# Patient Record
Sex: MALE | Race: WHITE | NOT HISPANIC OR LATINO | Employment: OTHER | URBAN - METROPOLITAN AREA
[De-identification: names, ages, dates, MRNs, and addresses within clinical notes are randomized per-mention and may not be internally consistent; named-entity substitution may affect disease eponyms.]

---

## 2017-09-13 ENCOUNTER — RECORDS - HEALTHEAST (OUTPATIENT)
Dept: LAB | Facility: CLINIC | Age: 72
End: 2017-09-13

## 2017-09-13 ENCOUNTER — RECORDS - HEALTHEAST (OUTPATIENT)
Dept: ADMINISTRATIVE | Facility: OTHER | Age: 72
End: 2017-09-13

## 2017-09-14 LAB
CHOLEST SERPL-MCNC: 151 MG/DL
FASTING STATUS PATIENT QL REPORTED: NO
HDLC SERPL-MCNC: 48 MG/DL
LDLC SERPL CALC-MCNC: 77 MG/DL
TRIGL SERPL-MCNC: 128 MG/DL

## 2017-10-13 ENCOUNTER — COMMUNICATION - HEALTHEAST (OUTPATIENT)
Dept: CARDIOLOGY | Facility: CLINIC | Age: 72
End: 2017-10-13

## 2017-10-13 DIAGNOSIS — E78.2 MIXED HYPERLIPIDEMIA: ICD-10-CM

## 2017-10-13 DIAGNOSIS — I10 HTN (HYPERTENSION): ICD-10-CM

## 2017-10-16 RX ORDER — LOSARTAN POTASSIUM 25 MG/1
TABLET ORAL
Qty: 30 TABLET | Refills: 0 | Status: SHIPPED | OUTPATIENT
Start: 2017-10-16

## 2017-10-16 RX ORDER — ATORVASTATIN CALCIUM 80 MG/1
TABLET, FILM COATED ORAL
Qty: 30 TABLET | Refills: 0 | Status: SHIPPED | OUTPATIENT
Start: 2017-10-16

## 2018-05-24 ENCOUNTER — COMMUNICATION - HEALTHEAST (OUTPATIENT)
Dept: ONCOLOGY | Facility: HOSPITAL | Age: 73
End: 2018-05-24

## 2018-06-26 ENCOUNTER — HOSPITAL ENCOUNTER (OUTPATIENT)
Dept: CT IMAGING | Facility: HOSPITAL | Age: 73
Setting detail: RADIATION/ONCOLOGY SERIES
Discharge: STILL ADMITTED OR ADMINISTRATIVE | End: 2018-06-26
Attending: INTERNAL MEDICINE

## 2018-06-26 DIAGNOSIS — R91.8 MULTIPLE LUNG NODULES ON CT: ICD-10-CM

## 2018-06-28 ENCOUNTER — RECORDS - HEALTHEAST (OUTPATIENT)
Dept: ADMINISTRATIVE | Facility: OTHER | Age: 73
End: 2018-06-28

## 2018-06-28 ENCOUNTER — OFFICE VISIT - HEALTHEAST (OUTPATIENT)
Dept: ONCOLOGY | Facility: HOSPITAL | Age: 73
End: 2018-06-28

## 2018-06-28 DIAGNOSIS — R91.8 MULTIPLE LUNG NODULES ON CT: ICD-10-CM

## 2018-07-19 ENCOUNTER — RECORDS - HEALTHEAST (OUTPATIENT)
Dept: LAB | Facility: CLINIC | Age: 73
End: 2018-07-19

## 2018-07-19 LAB
ALBUMIN SERPL-MCNC: 3.6 G/DL (ref 3.5–5)
ALP SERPL-CCNC: 107 U/L (ref 45–120)
ALT SERPL W P-5'-P-CCNC: 44 U/L (ref 0–45)
ANION GAP SERPL CALCULATED.3IONS-SCNC: 9 MMOL/L (ref 5–18)
AST SERPL W P-5'-P-CCNC: 31 U/L (ref 0–40)
BILIRUB SERPL-MCNC: 1.2 MG/DL (ref 0–1)
BUN SERPL-MCNC: 15 MG/DL (ref 8–28)
CALCIUM SERPL-MCNC: 9.1 MG/DL (ref 8.5–10.5)
CHLORIDE BLD-SCNC: 105 MMOL/L (ref 98–107)
CHOLEST SERPL-MCNC: 144 MG/DL
CO2 SERPL-SCNC: 28 MMOL/L (ref 22–31)
CREAT SERPL-MCNC: 0.9 MG/DL (ref 0.7–1.3)
ERYTHROCYTE [DISTWIDTH] IN BLOOD BY AUTOMATED COUNT: 12.8 % (ref 11–14.5)
FASTING STATUS PATIENT QL REPORTED: NORMAL
GFR SERPL CREATININE-BSD FRML MDRD: >60 ML/MIN/1.73M2
GLUCOSE BLD-MCNC: 111 MG/DL (ref 70–125)
HCT VFR BLD AUTO: 42.7 % (ref 40–54)
HDLC SERPL-MCNC: 44 MG/DL
HGB BLD-MCNC: 14.4 G/DL (ref 14–18)
LDLC SERPL CALC-MCNC: 80 MG/DL
MCH RBC QN AUTO: 31.6 PG (ref 27–34)
MCHC RBC AUTO-ENTMCNC: 33.7 G/DL (ref 32–36)
MCV RBC AUTO: 94 FL (ref 80–100)
PLATELET # BLD AUTO: 278 THOU/UL (ref 140–440)
PMV BLD AUTO: 10.6 FL (ref 8.5–12.5)
POTASSIUM BLD-SCNC: 4.7 MMOL/L (ref 3.5–5)
PROT SERPL-MCNC: 6.8 G/DL (ref 6–8)
PSA SERPL-MCNC: 0.8 NG/ML (ref 0–6.5)
RBC # BLD AUTO: 4.56 MILL/UL (ref 4.4–6.2)
SODIUM SERPL-SCNC: 142 MMOL/L (ref 136–145)
TRIGL SERPL-MCNC: 98 MG/DL
WBC: 5.8 THOU/UL (ref 4–11)

## 2018-07-24 ENCOUNTER — AMBULATORY - HEALTHEAST (OUTPATIENT)
Dept: CARDIOLOGY | Facility: CLINIC | Age: 73
End: 2018-07-24

## 2019-07-09 ENCOUNTER — RECORDS - HEALTHEAST (OUTPATIENT)
Dept: LAB | Facility: CLINIC | Age: 74
End: 2019-07-09

## 2019-07-09 LAB
ALBUMIN SERPL-MCNC: 3.8 G/DL (ref 3.5–5)
ALP SERPL-CCNC: 110 U/L (ref 45–120)
ALT SERPL W P-5'-P-CCNC: 43 U/L (ref 0–45)
ANION GAP SERPL CALCULATED.3IONS-SCNC: 9 MMOL/L (ref 5–18)
AST SERPL W P-5'-P-CCNC: 29 U/L (ref 0–40)
BILIRUB SERPL-MCNC: 1.3 MG/DL (ref 0–1)
BUN SERPL-MCNC: 17 MG/DL (ref 8–28)
CALCIUM SERPL-MCNC: 9.9 MG/DL (ref 8.5–10.5)
CHLORIDE BLD-SCNC: 101 MMOL/L (ref 98–107)
CHOLEST SERPL-MCNC: 151 MG/DL
CO2 SERPL-SCNC: 31 MMOL/L (ref 22–31)
CREAT SERPL-MCNC: 1 MG/DL (ref 0.7–1.3)
ERYTHROCYTE [DISTWIDTH] IN BLOOD BY AUTOMATED COUNT: 12.4 % (ref 11–14.5)
FASTING STATUS PATIENT QL REPORTED: NORMAL
GFR SERPL CREATININE-BSD FRML MDRD: >60 ML/MIN/1.73M2
GLUCOSE BLD-MCNC: 119 MG/DL (ref 70–125)
HCT VFR BLD AUTO: 47.1 % (ref 40–54)
HDLC SERPL-MCNC: 45 MG/DL
HGB BLD-MCNC: 15.3 G/DL (ref 14–18)
LDLC SERPL CALC-MCNC: 78 MG/DL
MCH RBC QN AUTO: 31 PG (ref 27–34)
MCHC RBC AUTO-ENTMCNC: 32.5 G/DL (ref 32–36)
MCV RBC AUTO: 95 FL (ref 80–100)
PLATELET # BLD AUTO: 284 THOU/UL (ref 140–440)
PMV BLD AUTO: 10.7 FL (ref 8.5–12.5)
POTASSIUM BLD-SCNC: 5 MMOL/L (ref 3.5–5)
PROT SERPL-MCNC: 7.4 G/DL (ref 6–8)
RBC # BLD AUTO: 4.94 MILL/UL (ref 4.4–6.2)
SODIUM SERPL-SCNC: 141 MMOL/L (ref 136–145)
TRIGL SERPL-MCNC: 138 MG/DL
WBC: 7.1 THOU/UL (ref 4–11)

## 2019-10-18 ENCOUNTER — AMBULATORY - HEALTHEAST (OUTPATIENT)
Dept: OTHER | Facility: CLINIC | Age: 74
End: 2019-10-18

## 2019-10-18 ENCOUNTER — NURSE TRIAGE (OUTPATIENT)
Dept: NURSING | Facility: CLINIC | Age: 74
End: 2019-10-18

## 2019-10-18 NOTE — TELEPHONE ENCOUNTER
He is going to go to [facility] for evaluation. He said he, in the past, didn't have a typical heart attack. He thinks this could be stress related. He describes it as feeling funny in his chest.  Sigrid Peck RN-[facility]      Additional Information    Negative: Passed out (i.e., fainted, collapsed and was not responding)    Negative: Shock suspected (e.g., cold/pale/clammy skin, too weak to stand, low BP, rapid pulse)    Negative: Difficult to awaken or acting confused (e.g., disoriented, slurred speech)    Negative: Visible sweat on face or sweat dripping down face    Negative: Unable to walk, or can only walk with assistance (e.g., requires support)    Negative: Received SHOCK from implantable cardiac defibrillator and has persisting symptoms (i.e., palpitations, lightheadedness)    Negative: Sounds like a life-threatening emergency to the triager    Negative: Chest pain    Negative: Difficulty breathing    Negative: Dizziness, lightheadedness, or weakness    Negative: Heart beating very rapidly (e.g., > 140 / minute) and present now (EXCEPTION: during exercise)    Negative: Heart beating very slowly (e.g., < 50 / minute) (EXCEPTION: athlete)    Negative: New or worsened shortness of breath with activity (dyspnea on exertion)    Patient sounds very sick or weak to the triager    Protocols used: HEART RATE AND HEARTBEAT [id number]

## 2020-09-09 ENCOUNTER — RECORDS - HEALTHEAST (OUTPATIENT)
Dept: LAB | Facility: CLINIC | Age: 75
End: 2020-09-09

## 2020-09-09 LAB
ALBUMIN SERPL-MCNC: 3.8 G/DL (ref 3.5–5)
ALP SERPL-CCNC: 111 U/L (ref 45–120)
ALT SERPL W P-5'-P-CCNC: 51 U/L (ref 0–45)
ANION GAP SERPL CALCULATED.3IONS-SCNC: 10 MMOL/L (ref 5–18)
AST SERPL W P-5'-P-CCNC: 32 U/L (ref 0–40)
BILIRUB SERPL-MCNC: 1.3 MG/DL (ref 0–1)
BUN SERPL-MCNC: 11 MG/DL (ref 8–28)
CALCIUM SERPL-MCNC: 9.6 MG/DL (ref 8.5–10.5)
CHLORIDE BLD-SCNC: 101 MMOL/L (ref 98–107)
CHOLEST SERPL-MCNC: 156 MG/DL
CO2 SERPL-SCNC: 30 MMOL/L (ref 22–31)
CREAT SERPL-MCNC: 1.02 MG/DL (ref 0.7–1.3)
FASTING STATUS PATIENT QL REPORTED: NORMAL
GFR SERPL CREATININE-BSD FRML MDRD: >60 ML/MIN/1.73M2
GLUCOSE BLD-MCNC: 114 MG/DL (ref 70–125)
HDLC SERPL-MCNC: 49 MG/DL
LDLC SERPL CALC-MCNC: 78 MG/DL
POTASSIUM BLD-SCNC: 5.3 MMOL/L (ref 3.5–5)
PROT SERPL-MCNC: 7.3 G/DL (ref 6–8)
PSA SERPL-MCNC: 1 NG/ML (ref 0–6.5)
SODIUM SERPL-SCNC: 141 MMOL/L (ref 136–145)
TRIGL SERPL-MCNC: 143 MG/DL

## 2021-06-01 VITALS — BODY MASS INDEX: 40.25 KG/M2 | WEIGHT: 249.38 LBS

## 2021-06-05 ENCOUNTER — RECORDS - HEALTHEAST (OUTPATIENT)
Dept: CARDIOLOGY | Facility: CLINIC | Age: 76
End: 2021-06-05

## 2021-06-05 DIAGNOSIS — I25.10 CORONARY ATHEROSCLEROSIS: ICD-10-CM

## 2021-06-05 DIAGNOSIS — E78.5 OTHER AND UNSPECIFIED HYPERLIPIDEMIA: ICD-10-CM

## 2021-06-16 PROBLEM — R07.9 CHEST PAIN: Status: ACTIVE | Noted: 2019-10-18

## 2021-06-19 NOTE — PROGRESS NOTES
[facility] Progress Note    Patient: Vignesh Seymour  MRN: [id number]  Date of Service: 6/28/2018        Reason for visit      1. Multiple lung nodules on CT        Assessment     Pulmonary nodules which seems benign. This is not changed.  Weight gain.  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  Good diet and exercise.  Advised to loose weight.    Clinical stage      No matching staging information was found for the patient.    History     Vignesh Seymour is a very pleasant 72 y.o. old male with a history of lung nodules located in both lung measuring 4mm in size, presenting incidentally after a post op hypoxia episode in 2011. Due to his over 20 pack year of smoking he has been on surveillance.    Comes in today for scheduled follow up.      Past Medical History     Past Medical History:   Diagnosis Date     Coronary artery disease     non STEMI 2012     Esophageal reflux      History of nuclear stress test 5/14/2013    8:06 minutes Favio protocol, 91% predicted max, sm to med sized inferior wall ischemic defect, EF 67%     Hyperlipidemia      Hypertension      Myocardial infarction, inferior wall (H) 8/2012           Review of Systems   Constitutional  Constitutional (WDL): All constitutional elements are within defined limits  Neurosensory  Neurosensory (WDL): All neurosensory elements are within defined limits  Cardiovascular  Cardiovascular (WDL): All cardiovascular elements are within defined limits  Pulmonary  Respiratory (WDL): Within Defined Limits  Gastrointestinal  Gastrointestinal (WDL): All gastrointestinal elements are within defined limits  Genitourinary  Genitourinary (WDL): All genitourinary elements are within defined limits  Integumentary  Integumentary (WDL): All integumentary elements are within defined limits  Patient Coping  Patient Coping: Accepting  Accompanied by  Accompanied by: Alone    ECOG performance status and Distress Assessment      ECOG Performance:     ECOG Performance Status: 0    Distress Assessment  Distress Assessment Score: No distress:     Pain Status  Currently in Pain: No/denies        Vital Signs     Vitals:    06/28/18 1415   BP: (!) 171/95   Pulse: 61   Temp: 98.6  F (37  C)   SpO2: 96%       Physical Exam     GENERAL: No acute distress. Cooperative in conversation.   HEENT: Pupils are equal, round and reactive. Oral mucosa is clean and intact. No ulcerations or mucositis noted. No bleeding noted.  RESP:Chest symmetric lungs are clear bilaterally per auscultation. Regular respiratory rate. No wheezes or rhonchi.  CV: Normal S1 S2 Regular, rate and rhythm. No murmurs.  ABD: Nondistended, soft, nontender. Positive bowel sounds. No organomegaly.   EXTREMITIES: No lower extremity edema.   NEURO: Non- focal. Alert and oriented x3.  Cranial nerves appear intact.  PSYCH: Within normal limits. No depression or anxiety.  SKIN: Warm dry intact.    LYMPH NODES: Bilateral cervical, supraclavicular, axillary lymph node examination was done.  Negative for any palpable adenopathy.      Lab Results     Results for orders placed or performed in visit on 09/13/17   Lipid Cascade   Result Value Ref Range    Cholesterol 151 <=199 mg/dL    Triglycerides 128 <=149 mg/dL    HDL Cholesterol 48 >=40 mg/dL    LDL Calculated 77 <=129 mg/dL    Patient Fasting > 8hrs? No    Comprehensive Metabolic Panel   Result Value Ref Range    Sodium 142 136 - 145 mmol/L    Potassium 4.4 3.5 - 5.0 mmol/L    Chloride 105 98 - 107 mmol/L    CO2 27 22 - 31 mmol/L    Anion Gap, Calculation 10 5 - 18 mmol/L    Glucose 105 70 - 125 mg/dL    BUN 15 8 - 28 mg/dL    Creatinine 1.11 0.70 - 1.30 mg/dL    GFR MDRD Af Amer >60 >60 mL/min/1.73m2    GFR MDRD Non Af Amer >60 >60 mL/min/1.73m2    Bilirubin, Total 1.4 (H) 0.0 - 1.0 mg/dL    Calcium 9.4 8.5 - 10.5 mg/dL    Protein, Total 7.6 6.0 - 8.0 g/dL    Albumin 4.0 3.5 - 5.0 g/dL    Alkaline Phosphatase 104 45 - 120 U/L    AST 33 0 - 40 U/L    ALT 43 0 -  45 U/L   HM2(CBC w/o Differential)   Result Value Ref Range    WBC 9.8 4.0 - 11.0 thou/uL    RBC 5.01 4.40 - 6.20 mill/uL    Hemoglobin 15.7 14.0 - 18.0 g/dL    Hematocrit 46.8 40.0 - 54.0 %    MCV 93 80 - 100 fL    MCH 31.3 27.0 - 34.0 pg    MCHC 33.5 32.0 - 36.0 g/dL    RDW 11.9 11.0 - 14.5 %    Platelets 299 140 - 440 thou/uL    MPV 11.2 8.5 - 12.5 fL         Imaging Results     Ct Chest Without Contrast    Result Date: 6/26/2018  CT CHEST WO CONTRAST 6/26/2018 7:08 AM INDICATION: Lung nodules TECHNIQUE: Routine chest. Dose reduction techniques were used. IV CONTRAST: None COMPARISON: CT chest exam 12/16/2013 and 6/21/2016 FINDINGS: LUNGS AND PLEURA: 3 mm nodule left upper lobe in image 22, 2 mm nodule left upper lobe on image 55, 4 mm nodule in the lingula on image 65, 3 mm nodule left lower lobe on image 67, and 3 mm nodule right lower lobe in image 59 are all stable dating back to 2013. No new nodules. No infiltrates or effusions. Small bilateral fat-containing Bochdalek hernias are unchanged. MEDIASTINUM: No enlarged mediastinal or hilar lymph nodes. Coronary artery calcifications. Small esophageal hiatal hernia. LIMITED UPPER ABDOMEN: Negative. MUSCULOSKELETAL: Hypertrophic changes thoracic spine.     CONCLUSION: 1.  Multiple bilateral pulmonary nodules have all been stable since 2013 and meet the criteria for benign nodules. No further follow-up required.        Randy Carrasco MD

## 2021-10-12 ENCOUNTER — LAB REQUISITION (OUTPATIENT)
Dept: LAB | Facility: CLINIC | Age: 76
End: 2021-10-12
Payer: MEDICARE

## 2021-10-12 DIAGNOSIS — E78.5 HYPERLIPIDEMIA, UNSPECIFIED: ICD-10-CM

## 2021-10-12 DIAGNOSIS — E66.9 OBESITY, UNSPECIFIED: ICD-10-CM

## 2021-10-12 DIAGNOSIS — Z00.00 ENCOUNTER FOR GENERAL ADULT MEDICAL EXAMINATION WITHOUT ABNORMAL FINDINGS: ICD-10-CM

## 2021-10-12 DIAGNOSIS — I25.10 ATHEROSCLEROTIC HEART DISEASE OF NATIVE CORONARY ARTERY WITHOUT ANGINA PECTORIS: ICD-10-CM

## 2021-10-12 LAB
ALBUMIN SERPL-MCNC: 3.5 G/DL (ref 3.5–5)
ALP SERPL-CCNC: 105 U/L (ref 45–120)
ALT SERPL W P-5'-P-CCNC: 45 U/L (ref 0–45)
ANION GAP SERPL CALCULATED.3IONS-SCNC: 13 MMOL/L (ref 5–18)
AST SERPL W P-5'-P-CCNC: 30 U/L (ref 0–40)
BILIRUB SERPL-MCNC: 1.1 MG/DL (ref 0–1)
BUN SERPL-MCNC: 10 MG/DL (ref 8–28)
CALCIUM SERPL-MCNC: 9.3 MG/DL (ref 8.5–10.5)
CHLORIDE BLD-SCNC: 100 MMOL/L (ref 98–107)
CHOLEST SERPL-MCNC: 144 MG/DL
CO2 SERPL-SCNC: 26 MMOL/L (ref 22–31)
CREAT SERPL-MCNC: 1 MG/DL (ref 0.7–1.3)
FASTING STATUS PATIENT QL REPORTED: NORMAL
GFR SERPL CREATININE-BSD FRML MDRD: 74 ML/MIN/1.73M2
GLUCOSE BLD-MCNC: 170 MG/DL (ref 70–125)
HDLC SERPL-MCNC: 47 MG/DL
LDLC SERPL CALC-MCNC: 71 MG/DL
POTASSIUM BLD-SCNC: 4.5 MMOL/L (ref 3.5–5)
PROT SERPL-MCNC: 6.5 G/DL (ref 6–8)
PSA SERPL-MCNC: 1.02 UG/L (ref 0–6.5)
SODIUM SERPL-SCNC: 139 MMOL/L (ref 136–145)
TRIGL SERPL-MCNC: 132 MG/DL

## 2021-10-12 PROCEDURE — G0103 PSA SCREENING: HCPCS | Mod: ORL | Performed by: STUDENT IN AN ORGANIZED HEALTH CARE EDUCATION/TRAINING PROGRAM

## 2021-10-12 PROCEDURE — 80061 LIPID PANEL: CPT | Mod: ORL | Performed by: STUDENT IN AN ORGANIZED HEALTH CARE EDUCATION/TRAINING PROGRAM

## 2021-10-12 PROCEDURE — 80053 COMPREHEN METABOLIC PANEL: CPT | Mod: ORL | Performed by: STUDENT IN AN ORGANIZED HEALTH CARE EDUCATION/TRAINING PROGRAM

## 2021-10-21 ENCOUNTER — APPOINTMENT (OUTPATIENT)
Dept: RADIOLOGY | Facility: HOSPITAL | Age: 76
End: 2021-10-21
Attending: EMERGENCY MEDICINE
Payer: MEDICARE

## 2021-10-21 LAB
ANION GAP SERPL CALCULATED.3IONS-SCNC: 9 MMOL/L (ref 5–18)
BASOPHILS # BLD AUTO: 0 10E3/UL (ref 0–0.2)
BASOPHILS NFR BLD AUTO: 0 %
BUN SERPL-MCNC: 12 MG/DL (ref 8–28)
CALCIUM SERPL-MCNC: 9.7 MG/DL (ref 8.5–10.5)
CHLORIDE BLD-SCNC: 101 MMOL/L (ref 98–107)
CO2 SERPL-SCNC: 29 MMOL/L (ref 22–31)
CREAT SERPL-MCNC: 1.05 MG/DL (ref 0.7–1.3)
EOSINOPHIL # BLD AUTO: 0.2 10E3/UL (ref 0–0.7)
EOSINOPHIL NFR BLD AUTO: 2 %
ERYTHROCYTE [DISTWIDTH] IN BLOOD BY AUTOMATED COUNT: 13.2 % (ref 10–15)
GFR SERPL CREATININE-BSD FRML MDRD: 70 ML/MIN/1.73M2
GLUCOSE BLD-MCNC: 107 MG/DL (ref 70–125)
HCT VFR BLD AUTO: 45.1 % (ref 40–53)
HGB BLD-MCNC: 14.8 G/DL (ref 13.3–17.7)
HOLD SPECIMEN: NORMAL
IMM GRANULOCYTES # BLD: 0 10E3/UL
IMM GRANULOCYTES NFR BLD: 0 %
LYMPHOCYTES # BLD AUTO: 2.8 10E3/UL (ref 0.8–5.3)
LYMPHOCYTES NFR BLD AUTO: 25 %
MCH RBC QN AUTO: 31.4 PG (ref 26.5–33)
MCHC RBC AUTO-ENTMCNC: 32.8 G/DL (ref 31.5–36.5)
MCV RBC AUTO: 96 FL (ref 78–100)
MONOCYTES # BLD AUTO: 1.3 10E3/UL (ref 0–1.3)
MONOCYTES NFR BLD AUTO: 12 %
NEUTROPHILS # BLD AUTO: 7 10E3/UL (ref 1.6–8.3)
NEUTROPHILS NFR BLD AUTO: 61 %
NRBC # BLD AUTO: 0 10E3/UL
NRBC BLD AUTO-RTO: 0 /100
PLATELET # BLD AUTO: 323 10E3/UL (ref 150–450)
POTASSIUM BLD-SCNC: 4.4 MMOL/L (ref 3.5–5)
RBC # BLD AUTO: 4.72 10E6/UL (ref 4.4–5.9)
SODIUM SERPL-SCNC: 139 MMOL/L (ref 136–145)
TROPONIN I SERPL-MCNC: <0.01 NG/ML (ref 0–0.29)
WBC # BLD AUTO: 11.3 10E3/UL (ref 4–11)

## 2021-10-21 PROCEDURE — 82247 BILIRUBIN TOTAL: CPT | Performed by: EMERGENCY MEDICINE

## 2021-10-21 PROCEDURE — 99285 EMERGENCY DEPT VISIT HI MDM: CPT | Mod: 25

## 2021-10-21 PROCEDURE — 93005 ELECTROCARDIOGRAM TRACING: CPT | Performed by: EMERGENCY MEDICINE

## 2021-10-21 PROCEDURE — 84484 ASSAY OF TROPONIN QUANT: CPT | Performed by: STUDENT IN AN ORGANIZED HEALTH CARE EDUCATION/TRAINING PROGRAM

## 2021-10-21 PROCEDURE — 85025 COMPLETE CBC W/AUTO DIFF WBC: CPT | Performed by: STUDENT IN AN ORGANIZED HEALTH CARE EDUCATION/TRAINING PROGRAM

## 2021-10-21 PROCEDURE — 36415 COLL VENOUS BLD VENIPUNCTURE: CPT | Performed by: STUDENT IN AN ORGANIZED HEALTH CARE EDUCATION/TRAINING PROGRAM

## 2021-10-21 PROCEDURE — 83690 ASSAY OF LIPASE: CPT | Performed by: EMERGENCY MEDICINE

## 2021-10-21 PROCEDURE — 84484 ASSAY OF TROPONIN QUANT: CPT | Performed by: EMERGENCY MEDICINE

## 2021-10-21 PROCEDURE — 80048 BASIC METABOLIC PNL TOTAL CA: CPT | Performed by: EMERGENCY MEDICINE

## 2021-10-21 PROCEDURE — 82040 ASSAY OF SERUM ALBUMIN: CPT | Performed by: EMERGENCY MEDICINE

## 2021-10-21 PROCEDURE — 85025 COMPLETE CBC W/AUTO DIFF WBC: CPT | Performed by: EMERGENCY MEDICINE

## 2021-10-21 PROCEDURE — 71046 X-RAY EXAM CHEST 2 VIEWS: CPT

## 2021-10-21 PROCEDURE — 85379 FIBRIN DEGRADATION QUANT: CPT | Performed by: EMERGENCY MEDICINE

## 2021-10-21 PROCEDURE — 80048 BASIC METABOLIC PNL TOTAL CA: CPT | Performed by: STUDENT IN AN ORGANIZED HEALTH CARE EDUCATION/TRAINING PROGRAM

## 2021-10-21 PROCEDURE — 93005 ELECTROCARDIOGRAM TRACING: CPT | Performed by: STUDENT IN AN ORGANIZED HEALTH CARE EDUCATION/TRAINING PROGRAM

## 2021-10-21 ASSESSMENT — MIFFLIN-ST. JEOR: SCORE: 1748.7

## 2021-10-22 ENCOUNTER — HOSPITAL ENCOUNTER (EMERGENCY)
Facility: HOSPITAL | Age: 76
Discharge: HOME | End: 2021-10-22
Attending: EMERGENCY MEDICINE | Admitting: EMERGENCY MEDICINE
Payer: MEDICARE

## 2021-10-22 VITALS
BODY MASS INDEX: 37.77 KG/M2 | TEMPERATURE: 97.3 F | HEIGHT: 66 IN | DIASTOLIC BLOOD PRESSURE: 69 MMHG | WEIGHT: 235 LBS | SYSTOLIC BLOOD PRESSURE: 120 MMHG | HEART RATE: 58 BPM | OXYGEN SATURATION: 97 % | RESPIRATION RATE: 18 BRPM

## 2021-10-22 DIAGNOSIS — R07.89 CHEST WALL PAIN: ICD-10-CM

## 2021-10-22 PROBLEM — I10 HYPERTENSION: Status: ACTIVE | Noted: 2021-10-22

## 2021-10-22 PROBLEM — F41.9 ANXIETY: Status: ACTIVE | Noted: 2021-10-22

## 2021-10-22 LAB
ALBUMIN SERPL-MCNC: 3.8 G/DL (ref 3.5–5)
ALBUMIN UR-MCNC: 10 MG/DL
ALP SERPL-CCNC: 113 U/L (ref 45–120)
ALT SERPL W P-5'-P-CCNC: 44 U/L (ref 0–45)
APPEARANCE UR: CLEAR
AST SERPL W P-5'-P-CCNC: 35 U/L (ref 0–40)
BILIRUB DIRECT SERPL-MCNC: 0.3 MG/DL
BILIRUB SERPL-MCNC: 0.9 MG/DL (ref 0–1)
BILIRUB UR QL STRIP: NEGATIVE
COLOR UR AUTO: YELLOW
D DIMER PPP FEU-MCNC: 0.48 UG/ML FEU (ref 0–0.5)
GLUCOSE UR STRIP-MCNC: NEGATIVE MG/DL
HGB UR QL STRIP: NEGATIVE
KETONES UR STRIP-MCNC: NEGATIVE MG/DL
LEUKOCYTE ESTERASE UR QL STRIP: NEGATIVE
LIPASE SERPL-CCNC: 67 U/L (ref 0–52)
MUCOUS THREADS #/AREA URNS LPF: PRESENT /LPF
NITRATE UR QL: NEGATIVE
PH UR STRIP: 5.5 [PH] (ref 5–7)
PROT SERPL-MCNC: 7.6 G/DL (ref 6–8)
RBC URINE: 0 /HPF
SP GR UR STRIP: 1.02 (ref 1–1.03)
SQUAMOUS EPITHELIAL: <1 /HPF
UROBILINOGEN UR STRIP-MCNC: <2 MG/DL
WBC URINE: <1 /HPF

## 2021-10-22 PROCEDURE — 81001 URINALYSIS AUTO W/SCOPE: CPT | Performed by: EMERGENCY MEDICINE

## 2021-10-22 PROCEDURE — 250N000013 HC RX MED GY IP 250 OP 250 PS 637: Performed by: EMERGENCY MEDICINE

## 2021-10-22 RX ORDER — ACETAMINOPHEN 325 MG/1
650 TABLET ORAL ONCE
Status: COMPLETED | OUTPATIENT
Start: 2021-10-22 | End: 2021-10-22

## 2021-10-22 RX ADMIN — ACETAMINOPHEN 650 MG: 325 TABLET ORAL at 03:02

## 2021-10-22 NOTE — ED TRIAGE NOTES
Patient presents to ED for evaluation of chest pain and HTN. Chest pain for past 3 days. History of MI in 2012. States it just kept getting worse so decided to come in.

## 2021-10-22 NOTE — ED PROVIDER NOTES
EMERGENCY DEPARTMENT ENCOUNTER      NAME: Vignesh Seymour  AGE: 76 year old male  YOB: 1945  MRN: [id number]  EVALUATION DATE & TIME: 10/22/2021  2:39 AM    PCP: Joel Guaman    ED PROVIDER: Trav Diaz M.D.      Chief Complaint   Patient presents with     Chest Pain         FINAL IMPRESSION:  1. Chest wall pain          ED COURSE & MEDICAL DECISION MAKING:    Pertinent Labs & Imaging studies reviewed. (See chart for details)  76 year old male presents to the Emergency Department for evaluation of chest pain. Patient appears non toxic with stable vitals signs, patient is afebrile with no tachycardia or hypoxia. Overall exam is benign. Lungs are clear and abdomen is benign, I cannot reproduce any significant tenderness with deep palpation of my abdominal exam, certainly no guarding or distention. Patient has been eating and drinking well, no change in bowel or bladder habits. Patient states that his symptoms feel nothing like his symptoms during his previous MI, he has had no substernal chest discomfort at all, no diaphoresis, shortness of breath, no vomiting. Points to the right lateral chest wall as the source of his pain, no abdominal pain, no right upper quadrant tenderness, no flank pain or CVA tenderness. Nothing to suggest cholecystitis, pancreatitis, nephrolithiasis, pyelonephritis, considered but overall very low suspicion for ACS, considered but low suspicion for PE, no cough or fever to suggest pneumonia, no body aches or fever to suggest Covid, no wheezing to suggest CHF, COPD, asthma or other restrictive lung process. We will obtain screening labs including troponin, ECG, chest x-ray, D-dimer, urine studies. Patient's symptoms had markedly improved since he arrived, he was given Tylenol here, he had already taken aspirin.    Reassessment: Troponin negative, ECG nonischemic, given duration of symptoms, atypical story and well appearance, though the patient does have cardiac risk  factors at this time nothing by our work-up or exam suggest cardiac etiology.  Again the patient even states that this does not at all feel like his previous MI, his symptoms are more right lateral chest wall.  Rest of the labs showed no acute concerning findings, slightly elevated lipase but certainly nothing to suggest pancreatitis, he has no epigastric tenderness.  Urine studies showed no blood or signs of infection, D-dimer was negative, chest x-ray reported no acute concerning findings.  Following Tylenol and repeat evaluation patient states that his right lateral chest discomfort has completely resolved.  I suspect more so chest wall musculoskeletal etiology of his symptoms.  With negative work-up and benign exam and resolution of symptoms feel he is safe for discharge and close follow-up.  Will recommend conservative management at home and have the patient follow-up with his primary care provider in the next 24 hours for continued outpatient management evaluation.  Discussed these findings and recommendations with the patient felt reassured and comfortable with discharge.  Return precautions provided.    2:55 AM I met with the patient for the initial interview and physical examination. Discussed plan for treatment and workup in the ED.    4:59 AM Repeat exam benign. Discussed findings and discharged with close follow-up.  At the conclusion of the encounter I discussed the results of all of the tests and the disposition. The questions were answered and return precautions provided. The patient or family acknowledged understanding and was agreeable with the care plan.     PPE: Provider wore gloves, N95 mask, eye protection, surgical cap, and paper mask.     MEDICATIONS GIVEN IN THE EMERGENCY:  Medications   acetaminophen (TYLENOL) tablet 650 mg (650 mg Oral Given 10/22/21 0302)       NEW PRESCRIPTIONS STARTED AT TODAY'S ER VISIT  Discharge Medication List as of 10/22/2021  5:05 AM            "    =================================================================    HPI    Patient information was obtained from: The patient    Use of Intrepreter: N/A       Vignesh Seymour is a 76 year old male with a pertient medical history of CAD and hypertension who presents to the ED via walk-in for evaluation of chest pain.    The patient reports that ~2 days ago he developed right ribcage pain that initially presented as a slight discomfort but in the past ~24 hours he mowed the lawn and afterwards it has progressively worsened. When he presented to the ED he ranked his pain at a 6-7/10, but states that it has since slightly improved. He states the pain feels similar to a \"pulled muscle\" and does not feel like his previous heart attack. His previous heart attack felt substernal and like \"a bubble\" which is not at all how his current symptoms present. He takes a full Aspirin in the morning a baby Aspirin at night and states this has helped his symptoms. The only abdominal surgery that he reports is an umbilical hernia repair. Of note, the patient also endorses having slightly more cloudy urine than usual. The patient denies any recent falls. Denies vomiting, fever, shortness of breath, difficulty urinating, diarrhea, constipation, dysuria, rashes, and any other symptoms or complaints at this time.     REVIEW OF SYSTEMS   Constitutional:  Denies fever, chills  Respiratory:  Denies productive cough or increased work of breathing  Cardiovascular:  Denies chest pain, palpitations  GI:  Denies abdominal pain, nausea, vomiting, dysuria. Positive for cloudy urine.   Musculoskeletal:  Positive for right ribcage pain.  Skin:  Denies rash   Neurologic:  Denies focal weakness  All systems negative except as marked.     PAST MEDICAL HISTORY:  History reviewed. No pertinent past medical history.    PAST SURGICAL HISTORY:  Past Surgical History:   Procedure Laterality Date     CARDIAC CATHETERIZATION      2012 thrombectomy and PCI/BMS " to RCA, moderate LAD/Diag bifurcation stenosis     CORONARY ANGIOPLASTY      BMS prox RCA 8/24/2012         CURRENT MEDICATIONS:    Prior to Admission medications    Medication Sig Start Date End Date Taking? Authorizing Provider   acetaminophen (TYLENOL) 500 MG tablet [ACETAMINOPHEN (TYLENOL) 500 MG TABLET] Take 500-1,000 mg by mouth every 6 (six) hours as needed for pain. 10/18/19   Provider, Historical   aspirin 81 MG EC tablet [ASPIRIN 81 MG EC TABLET] Take 81 mg by mouth every evening.        8/14/14   Provider, Historical   atorvastatin (LIPITOR) 80 MG tablet [ATORVASTATIN (LIPITOR) 80 MG TABLET] TAKE 1 TABLET AT BEDTIME 10/16/17   Tylor Davenport MD   cholecalciferol, vitamin D3, 1,000 unit tablet [CHOLECALCIFEROL, VITAMIN D3, 1,000 UNIT TABLET] Take 1,000 Units by mouth daily. 8/29/16   Provider, Historical   ferrous sulfate 325 (65 FE) MG tablet [FERROUS SULFATE 325 (65 FE) MG TABLET] Take 2 tablets by mouth daily with breakfast. 10/18/19   Provider, Historical   losartan (COZAAR) 25 MG tablet [LOSARTAN (COZAAR) 25 MG TABLET] TAKE 1 TABLET DAILY 10/16/17   Tylor Davenport MD   multivitamin (MULTIVITAMIN) per tablet [MULTIVITAMIN (MULTIVITAMIN) PER TABLET] Take 1 tablet by mouth daily. 8/14/14   Provider, Historical   nitroglycerin (NITROSTAT) 0.4 MG SL tablet [NITROGLYCERIN (NITROSTAT) 0.4 MG SL TABLET] Place 1 tablet (0.4 mg total) under the tongue every 5 (five) minutes as needed for chest pain. 8/29/16   Tylor Davenport MD   omeprazole (PRILOSEC) 40 MG capsule [OMEPRAZOLE (PRILOSEC) 40 MG CAPSULE] Take 40 mg by mouth daily before breakfast.        8/14/14   Provider, Historical        ALLERGIES:  Allergies   Allergen Reactions     Ibuprofen Unknown     Told not to take ibuprofen with losartan.     Penicillins Rash     To IM injection only. Has tolerated amoxicillin and ampicillin orally without problems.       FAMILY HISTORY:  Family History   Problem Relation Age of Onset     Hypertension  "Mother      Pulmonary fibrosis Father      No Known Problems Sister      Coronary Artery Disease Brother      No Known Problems Sister      Hypertension Brother      Hypertension Son        SOCIAL HISTORY:   Social History     Socioeconomic History     Marital status: Single     Spouse name: Not on file     Number of children: Not on file     Years of education: Not on file     Highest education level: Not on file   Occupational History     Not on file   Tobacco Use     Smoking status: Former Smoker     Packs/day: 1.00     Years: 17.00     Pack years: 17.00     Quit date: 2004     Years since quittin.7     Smokeless tobacco: Never Used   Substance and Sexual Activity     Alcohol use: Yes     Drug use: No     Sexual activity: Never   Other Topics Concern     Not on file   Social History Narrative     Not on file     Social Determinants of Health     Financial Resource Strain:      Difficulty of Paying Living Expenses:    Food Insecurity:      Worried About Running Out of Food in the Last Year:      Ran Out of Food in the Last Year:    Transportation Needs:      Lack of Transportation (Medical):      Lack of Transportation (Non-Medical):    Physical Activity:      Days of Exercise per Week:      Minutes of Exercise per Session:    Stress:      Feeling of Stress :    Social Connections:      Frequency of Communication with Friends and Family:      Frequency of Social Gatherings with Friends and Family:      Attends Church Services:      Active Member of Clubs or Organizations:      Attends Club or Organization Meetings:      Marital Status:    Intimate Partner Violence:      Fear of Current or Ex-Partner:      Emotionally Abused:      Physically Abused:      Sexually Abused:        VITALS:  Patient Vitals for the past 24 hrs:   BP Temp Temp src Pulse Resp SpO2 Height Weight   10/22/21 0500 120/69 -- -- 58 -- 97 % -- --   10/21/21 2231 (!) 182/84 97.3  F (36.3  C) Temporal 75 18 95 % 1.676 m (5' 6\") 106.6 " kg (235 lb)        PHYSICAL EXAM    Constitutional:  Awake, alert, in no apparent distress  HENT:  Normocephalic, Atraumatic. Bilateral external ears normal. Oropharynx moist. Nose normal. Neck- Normal range of motion with no guarding, No midline cervical tenderness, Supple, No stridor.   Eyes:  PERRL, EOMI with no signs of entrapment, Conjunctiva normal, No discharge.   Respiratory:  Normal breath sounds, No respiratory distress, No wheezing.    Cardiovascular:  Normal heart rate, Normal rhythm, No appreciable rubs or gallops.   GI:  Soft, No tenderness, No distension, No palpable masses. No CVA tenderness  Musculoskeletal:  Intact distal pulses, No edema. Good range of motion in all major joints. No tenderness to palpation or major deformities noted.  Integument:  Warm, Dry, No erythema, No rash.   Neurologic:  Alert & oriented, Normal motor function, Normal sensory function, No focal deficits noted.   Psychiatric:  Affect normal, Judgment normal, Mood normal.     LAB:  All pertinent labs reviewed and interpreted.  Results for orders placed or performed during the hospital encounter of 10/22/21   XR Chest 2 Views    Impression    IMPRESSION: Cardiomediastinal silhouette is within normal limits. Minimal fibroatelectasis similar. No focal consolidation or pleural effusion. Degenerative change osseous structures.   Extra Blue Top Tube   Result Value Ref Range    Hold Specimen JIC    Extra Green Top (Lithium Heparin) Tube   Result Value Ref Range    Hold Specimen JIC    Extra Purple Top Tube   Result Value Ref Range    Hold Specimen JIC    Basic metabolic panel   Result Value Ref Range    Sodium 139 136 - 145 mmol/L    Potassium 4.4 3.5 - 5.0 mmol/L    Chloride 101 98 - 107 mmol/L    Carbon Dioxide (CO2) 29 22 - 31 mmol/L    Anion Gap 9 5 - 18 mmol/L    Urea Nitrogen 12 8 - 28 mg/dL    Creatinine 1.05 0.70 - 1.30 mg/dL    Calcium 9.7 8.5 - 10.5 mg/dL    Glucose 107 70 - 125 mg/dL    GFR Estimate 70 >60 mL/min/1.73m2    Result Value Ref Range    Troponin I <0.01 0.00 - 0.29 ng/mL   CBC with platelets and differential   Result Value Ref Range    WBC Count 11.3 (H) 4.0 - 11.0 10e3/uL    RBC Count 4.72 4.40 - 5.90 10e6/uL    Hemoglobin 14.8 13.3 - 17.7 g/dL    Hematocrit 45.1 40.0 - 53.0 %    MCV 96 78 - 100 fL    MCH 31.4 26.5 - 33.0 pg    MCHC 32.8 31.5 - 36.5 g/dL    RDW 13.2 10.0 - 15.0 %    Platelet Count 323 150 - 450 10e3/uL    % Neutrophils 61 %    % Lymphocytes 25 %    % Monocytes 12 %    % Eosinophils 2 %    % Basophils 0 %    % Immature Granulocytes 0 %    NRBCs per 100 WBC 0 <1 /100    Absolute Neutrophils 7.0 1.6 - 8.3 10e3/uL    Absolute Lymphocytes 2.8 0.8 - 5.3 10e3/uL    Absolute Monocytes 1.3 0.0 - 1.3 10e3/uL    Absolute Eosinophils 0.2 0.0 - 0.7 10e3/uL    Absolute Basophils 0.0 0.0 - 0.2 10e3/uL    Absolute Immature Granulocytes 0.0 <=0.0 10e3/uL    Absolute NRBCs 0.0 10e3/uL   UA with Microscopic reflex to Culture    Specimen: Urine, Midstream   Result Value Ref Range    Color Urine Yellow Colorless, Straw, Light Yellow, Yellow    Appearance Urine Clear Clear    Glucose Urine Negative Negative mg/dL    Bilirubin Urine Negative Negative    Ketones Urine Negative Negative mg/dL    Specific Gravity Urine 1.024 1.001 - 1.030    Blood Urine Negative Negative    pH Urine 5.5 5.0 - 7.0    Protein Albumin Urine 10  (A) Negative mg/dL    Urobilinogen Urine <2.0 <2.0 mg/dL    Nitrite Urine Negative Negative    Leukocyte Esterase Urine Negative Negative    Mucus Urine Present (A) None Seen /LPF    RBC Urine 0 <=2 /HPF    WBC Urine <1 <=5 /HPF    Squamous Epithelials Urine <1 <=1 /HPF   D dimer quantitative   Result Value Ref Range    D-Dimer Quantitative 0.48 0.00 - 0.50 ug/mL FEU   Hepatic function panel   Result Value Ref Range    Bilirubin Total 0.9 0.0 - 1.0 mg/dL    Bilirubin Direct 0.3 <=0.5 mg/dL    Protein Total 7.6 6.0 - 8.0 g/dL    Albumin 3.8 3.5 - 5.0 g/dL    Alkaline Phosphatase 113 45 - 120 U/L    AST 35 0 -  40 U/L    ALT 44 0 - 45 U/L   Result Value Ref Range    Lipase 67 (H) 0 - 52 U/L       RADIOLOGY:  XR Chest 2 Views   Final Result   IMPRESSION: Cardiomediastinal silhouette is within normal limits. Minimal fibroatelectasis similar. No focal consolidation or pleural effusion. Degenerative change osseous structures.             EKG:    Sinus bradycardia, no specific ST acute ischemic changes, no concerning dysrhythmias or interval prolongation, when compared to ECG of 10/18/2019, no specific ST acute ischemic changes appreciated  I have independently reviewed and interpreted the EKG(s) documented above.    PROCEDURES:   None       I, Taj Haas, am serving as a scribe to document services personally performed by Trav Diaz MD, based on my observation and the provider's statements to me. I, Trav Diaz MD attest that Taj Haas is acting in a scribe capacity, has observed my performance of the services and has documented them in accordance with my direction.    Trav Diaz M.D.  Emergency Medicine  [facility] EMERGENCY DEPARTMENT  [address] [place] [id number]  [phone]  Dept: [phone]       Trav Diaz MD  10/22/21 0036

## 2021-10-23 LAB
ATRIAL RATE - MUSE: 59 BPM
DIASTOLIC BLOOD PRESSURE - MUSE: NORMAL MMHG
INTERPRETATION ECG - MUSE: NORMAL
P AXIS - MUSE: 21 DEGREES
PR INTERVAL - MUSE: 182 MS
QRS DURATION - MUSE: 86 MS
QT - MUSE: 390 MS
QTC - MUSE: 386 MS
R AXIS - MUSE: 16 DEGREES
SYSTOLIC BLOOD PRESSURE - MUSE: NORMAL MMHG
T AXIS - MUSE: 22 DEGREES
VENTRICULAR RATE- MUSE: 59 BPM

## 2022-06-24 ENCOUNTER — LAB REQUISITION (OUTPATIENT)
Dept: LAB | Facility: CLINIC | Age: 77
End: 2022-06-24
Payer: MEDICARE

## 2022-06-24 DIAGNOSIS — I10 ESSENTIAL (PRIMARY) HYPERTENSION: ICD-10-CM

## 2022-06-24 DIAGNOSIS — R73.9 HYPERGLYCEMIA, UNSPECIFIED: ICD-10-CM

## 2022-06-24 LAB
ANION GAP SERPL CALCULATED.3IONS-SCNC: 9 MMOL/L (ref 5–18)
BUN SERPL-MCNC: 13 MG/DL (ref 8–28)
CALCIUM SERPL-MCNC: 9.2 MG/DL (ref 8.5–10.5)
CHLORIDE BLD-SCNC: 101 MMOL/L (ref 98–107)
CO2 SERPL-SCNC: 30 MMOL/L (ref 22–31)
CREAT SERPL-MCNC: 1.02 MG/DL (ref 0.7–1.3)
ERYTHROCYTE [DISTWIDTH] IN BLOOD BY AUTOMATED COUNT: 12.4 % (ref 10–15)
GFR SERPL CREATININE-BSD FRML MDRD: 77 ML/MIN/1.73M2
GLUCOSE BLD-MCNC: 93 MG/DL (ref 70–125)
HCT VFR BLD AUTO: 43.6 % (ref 40–53)
HGB BLD-MCNC: 14.1 G/DL (ref 13.3–17.7)
MCH RBC QN AUTO: 31.5 PG (ref 26.5–33)
MCHC RBC AUTO-ENTMCNC: 32.3 G/DL (ref 31.5–36.5)
MCV RBC AUTO: 97 FL (ref 78–100)
PLATELET # BLD AUTO: 292 10E3/UL (ref 150–450)
POTASSIUM BLD-SCNC: 4.5 MMOL/L (ref 3.5–5)
RBC # BLD AUTO: 4.48 10E6/UL (ref 4.4–5.9)
SODIUM SERPL-SCNC: 140 MMOL/L (ref 136–145)
WBC # BLD AUTO: 7.8 10E3/UL (ref 4–11)

## 2022-06-24 PROCEDURE — 85027 COMPLETE CBC AUTOMATED: CPT | Mod: ORL | Performed by: FAMILY MEDICINE

## 2022-06-24 PROCEDURE — 80048 BASIC METABOLIC PNL TOTAL CA: CPT | Mod: ORL | Performed by: FAMILY MEDICINE

## 2022-06-24 PROCEDURE — 83036 HEMOGLOBIN GLYCOSYLATED A1C: CPT | Mod: ORL | Performed by: FAMILY MEDICINE

## 2022-06-25 LAB — HBA1C MFR BLD: 6.2 %

## 2022-10-26 ENCOUNTER — LAB REQUISITION (OUTPATIENT)
Dept: LAB | Facility: CLINIC | Age: 77
End: 2022-10-26
Payer: MEDICARE

## 2022-10-26 DIAGNOSIS — I10 ESSENTIAL (PRIMARY) HYPERTENSION: ICD-10-CM

## 2022-10-26 LAB
ALBUMIN SERPL BCG-MCNC: 4.3 G/DL (ref 3.5–5.2)
ALP SERPL-CCNC: 111 U/L (ref 40–129)
ALT SERPL W P-5'-P-CCNC: 46 U/L (ref 10–50)
ANION GAP SERPL CALCULATED.3IONS-SCNC: 12 MMOL/L (ref 7–15)
AST SERPL W P-5'-P-CCNC: 34 U/L (ref 10–50)
BILIRUB SERPL-MCNC: 1.4 MG/DL
BUN SERPL-MCNC: 16.9 MG/DL (ref 8–23)
CALCIUM SERPL-MCNC: 9.7 MG/DL (ref 8.8–10.2)
CHLORIDE SERPL-SCNC: 96 MMOL/L (ref 98–107)
CREAT SERPL-MCNC: 0.95 MG/DL (ref 0.67–1.17)
DEPRECATED HCO3 PLAS-SCNC: 27 MMOL/L (ref 22–29)
GFR SERPL CREATININE-BSD FRML MDRD: 83 ML/MIN/1.73M2
GLUCOSE SERPL-MCNC: 116 MG/DL (ref 70–99)
POTASSIUM SERPL-SCNC: 4.9 MMOL/L (ref 3.4–5.3)
PROT SERPL-MCNC: 7.1 G/DL (ref 6.4–8.3)
SODIUM SERPL-SCNC: 135 MMOL/L (ref 136–145)

## 2022-10-26 PROCEDURE — 80053 COMPREHEN METABOLIC PANEL: CPT | Mod: ORL | Performed by: NURSE PRACTITIONER

## 2023-09-05 ENCOUNTER — LAB REQUISITION (OUTPATIENT)
Dept: LAB | Facility: CLINIC | Age: 78
End: 2023-09-05
Payer: MEDICARE

## 2023-09-05 DIAGNOSIS — I10 ESSENTIAL (PRIMARY) HYPERTENSION: ICD-10-CM

## 2023-09-05 DIAGNOSIS — E78.5 HYPERLIPIDEMIA, UNSPECIFIED: ICD-10-CM

## 2023-09-05 LAB
ALBUMIN SERPL BCG-MCNC: 4.2 G/DL (ref 3.5–5.2)
ALP SERPL-CCNC: 91 U/L (ref 40–129)
ALT SERPL W P-5'-P-CCNC: 38 U/L (ref 0–70)
ANION GAP SERPL CALCULATED.3IONS-SCNC: 9 MMOL/L (ref 7–15)
AST SERPL W P-5'-P-CCNC: 26 U/L (ref 0–45)
BILIRUB SERPL-MCNC: 1.3 MG/DL
BUN SERPL-MCNC: 14.5 MG/DL (ref 8–23)
CALCIUM SERPL-MCNC: 9.5 MG/DL (ref 8.8–10.2)
CHLORIDE SERPL-SCNC: 101 MMOL/L (ref 98–107)
CHOLEST SERPL-MCNC: 126 MG/DL
CREAT SERPL-MCNC: 1.01 MG/DL (ref 0.67–1.17)
DEPRECATED HCO3 PLAS-SCNC: 29 MMOL/L (ref 22–29)
ERYTHROCYTE [DISTWIDTH] IN BLOOD BY AUTOMATED COUNT: 12.7 % (ref 10–15)
GFR SERPL CREATININE-BSD FRML MDRD: 78 ML/MIN/1.73M2
GLUCOSE SERPL-MCNC: 106 MG/DL (ref 70–99)
HCT VFR BLD AUTO: 47 % (ref 40–53)
HDLC SERPL-MCNC: 42 MG/DL
HGB BLD-MCNC: 15.4 G/DL (ref 13.3–17.7)
LDLC SERPL CALC-MCNC: 62 MG/DL
MCH RBC QN AUTO: 31.8 PG (ref 26.5–33)
MCHC RBC AUTO-ENTMCNC: 32.8 G/DL (ref 31.5–36.5)
MCV RBC AUTO: 97 FL (ref 78–100)
NONHDLC SERPL-MCNC: 84 MG/DL
PLATELET # BLD AUTO: 289 10E3/UL (ref 150–450)
POTASSIUM SERPL-SCNC: 5 MMOL/L (ref 3.4–5.3)
PROT SERPL-MCNC: 6.8 G/DL (ref 6.4–8.3)
RBC # BLD AUTO: 4.85 10E6/UL (ref 4.4–5.9)
SODIUM SERPL-SCNC: 139 MMOL/L (ref 136–145)
TRIGL SERPL-MCNC: 110 MG/DL
WBC # BLD AUTO: 7.4 10E3/UL (ref 4–11)

## 2023-09-05 PROCEDURE — 85027 COMPLETE CBC AUTOMATED: CPT | Mod: ORL | Performed by: FAMILY MEDICINE

## 2023-09-05 PROCEDURE — 80061 LIPID PANEL: CPT | Mod: ORL | Performed by: FAMILY MEDICINE

## 2023-09-05 PROCEDURE — 80053 COMPREHEN METABOLIC PANEL: CPT | Mod: ORL | Performed by: FAMILY MEDICINE

## 2024-10-02 ENCOUNTER — LAB REQUISITION (OUTPATIENT)
Dept: LAB | Facility: CLINIC | Age: 79
End: 2024-10-02
Payer: MEDICARE

## 2024-10-02 DIAGNOSIS — E11.9 TYPE 2 DIABETES MELLITUS WITHOUT COMPLICATIONS (H): ICD-10-CM

## 2024-10-02 DIAGNOSIS — I10 ESSENTIAL (PRIMARY) HYPERTENSION: ICD-10-CM

## 2024-10-02 DIAGNOSIS — E78.5 HYPERLIPIDEMIA, UNSPECIFIED: ICD-10-CM

## 2024-10-02 PROCEDURE — 80053 COMPREHEN METABOLIC PANEL: CPT | Mod: ORL | Performed by: NURSE PRACTITIONER

## 2024-10-02 PROCEDURE — 82043 UR ALBUMIN QUANTITATIVE: CPT | Mod: ORL | Performed by: NURSE PRACTITIONER

## 2024-10-02 PROCEDURE — 80061 LIPID PANEL: CPT | Mod: ORL | Performed by: NURSE PRACTITIONER

## 2024-10-03 LAB
ALBUMIN SERPL BCG-MCNC: 4.2 G/DL (ref 3.5–5.2)
ALP SERPL-CCNC: 94 U/L (ref 40–150)
ALT SERPL W P-5'-P-CCNC: 31 U/L (ref 0–70)
ANION GAP SERPL CALCULATED.3IONS-SCNC: 12 MMOL/L (ref 7–15)
AST SERPL W P-5'-P-CCNC: 26 U/L (ref 0–45)
BILIRUB SERPL-MCNC: 1 MG/DL
BUN SERPL-MCNC: 14.9 MG/DL (ref 8–23)
CALCIUM SERPL-MCNC: 9.4 MG/DL (ref 8.8–10.4)
CHLORIDE SERPL-SCNC: 98 MMOL/L (ref 98–107)
CHOLEST SERPL-MCNC: 141 MG/DL
CREAT SERPL-MCNC: 1.08 MG/DL (ref 0.67–1.17)
CREAT UR-MCNC: 58.3 MG/DL
EGFRCR SERPLBLD CKD-EPI 2021: 71 ML/MIN/1.73M2
FASTING STATUS PATIENT QL REPORTED: NORMAL
FASTING STATUS PATIENT QL REPORTED: NORMAL
GLUCOSE SERPL-MCNC: 94 MG/DL (ref 70–99)
HCO3 SERPL-SCNC: 27 MMOL/L (ref 22–29)
HDLC SERPL-MCNC: 47 MG/DL
LDLC SERPL CALC-MCNC: 71 MG/DL
MICROALBUMIN UR-MCNC: <12 MG/L
MICROALBUMIN/CREAT UR: NORMAL MG/G{CREAT}
NONHDLC SERPL-MCNC: 94 MG/DL
POTASSIUM SERPL-SCNC: 4.4 MMOL/L (ref 3.4–5.3)
PROT SERPL-MCNC: 7.4 G/DL (ref 6.4–8.3)
SODIUM SERPL-SCNC: 137 MMOL/L (ref 135–145)
TRIGL SERPL-MCNC: 115 MG/DL

## 2025-08-25 ENCOUNTER — LAB REQUISITION (OUTPATIENT)
Dept: LAB | Facility: CLINIC | Age: 80
End: 2025-08-25
Payer: MEDICARE

## 2025-08-25 DIAGNOSIS — E78.5 HYPERLIPIDEMIA, UNSPECIFIED: ICD-10-CM

## 2025-08-25 DIAGNOSIS — D50.8 OTHER IRON DEFICIENCY ANEMIAS: ICD-10-CM

## 2025-08-25 DIAGNOSIS — E11.9 TYPE 2 DIABETES MELLITUS WITHOUT COMPLICATIONS (H): ICD-10-CM

## 2025-08-25 LAB
ALBUMIN SERPL BCG-MCNC: 4.1 G/DL (ref 3.5–5.2)
ALP SERPL-CCNC: 90 U/L (ref 40–150)
ALT SERPL W P-5'-P-CCNC: 30 U/L (ref 0–70)
ANION GAP SERPL CALCULATED.3IONS-SCNC: 12 MMOL/L (ref 7–15)
AST SERPL W P-5'-P-CCNC: 26 U/L (ref 0–45)
BILIRUB SERPL-MCNC: 1 MG/DL
BUN SERPL-MCNC: 11.6 MG/DL (ref 8–23)
CALCIUM SERPL-MCNC: 9.5 MG/DL (ref 8.8–10.4)
CHLORIDE SERPL-SCNC: 98 MMOL/L (ref 98–107)
CHOLEST SERPL-MCNC: 130 MG/DL
CREAT SERPL-MCNC: 1.01 MG/DL (ref 0.67–1.17)
CREAT UR-MCNC: 94.4 MG/DL
EGFRCR SERPLBLD CKD-EPI 2021: 77 ML/MIN/1.73M2
ERYTHROCYTE [DISTWIDTH] IN BLOOD BY AUTOMATED COUNT: 13.1 % (ref 10–15)
FASTING STATUS PATIENT QL REPORTED: ABNORMAL
FASTING STATUS PATIENT QL REPORTED: NORMAL
GLUCOSE SERPL-MCNC: 112 MG/DL (ref 70–99)
HCO3 SERPL-SCNC: 27 MMOL/L (ref 22–29)
HCT VFR BLD AUTO: 47.1 % (ref 40–53)
HDLC SERPL-MCNC: 46 MG/DL
HGB BLD-MCNC: 14.9 G/DL (ref 13.3–17.7)
LDLC SERPL CALC-MCNC: 64 MG/DL
MCH RBC QN AUTO: 30.3 PG (ref 26.5–33)
MCHC RBC AUTO-ENTMCNC: 31.6 G/DL (ref 31.5–36.5)
MCV RBC AUTO: 95.7 FL (ref 78–100)
MICROALBUMIN UR-MCNC: <12 MG/L
MICROALBUMIN/CREAT UR: NORMAL MG/G{CREAT}
NONHDLC SERPL-MCNC: 84 MG/DL
PLATELET # BLD AUTO: 333 10E3/UL (ref 150–450)
POTASSIUM SERPL-SCNC: 4.5 MMOL/L (ref 3.4–5.3)
PROT SERPL-MCNC: 7.1 G/DL (ref 6.4–8.3)
RBC # BLD AUTO: 4.92 10E6/UL (ref 4.4–5.9)
SODIUM SERPL-SCNC: 137 MMOL/L (ref 135–145)
TRIGL SERPL-MCNC: 99 MG/DL
WBC # BLD AUTO: 7.84 10E3/UL (ref 4–11)